# Patient Record
Sex: MALE | Race: BLACK OR AFRICAN AMERICAN | NOT HISPANIC OR LATINO | URBAN - METROPOLITAN AREA
[De-identification: names, ages, dates, MRNs, and addresses within clinical notes are randomized per-mention and may not be internally consistent; named-entity substitution may affect disease eponyms.]

---

## 2020-06-24 ENCOUNTER — OFFICE VISIT (OUTPATIENT)
Dept: URBAN - METROPOLITAN AREA CLINIC 96 | Facility: CLINIC | Age: 30
End: 2020-06-24
Payer: COMMERCIAL

## 2020-06-24 ENCOUNTER — DASHBOARD ENCOUNTERS (OUTPATIENT)
Age: 30
End: 2020-06-24

## 2020-06-24 DIAGNOSIS — R10.84 ABDOMINAL CRAMPING, GENERALIZED: ICD-10-CM

## 2020-06-24 DIAGNOSIS — R13.19 OTHER DYSPHAGIA: ICD-10-CM

## 2020-06-24 DIAGNOSIS — R11.2 NAUSEA & VOMITING: ICD-10-CM

## 2020-06-24 DIAGNOSIS — K92.0 HEMATEMESIS: ICD-10-CM

## 2020-06-24 PROCEDURE — 99204 OFFICE O/P NEW MOD 45 MIN: CPT | Performed by: INTERNAL MEDICINE

## 2020-06-24 PROCEDURE — G8420 CALC BMI NORM PARAMETERS: HCPCS | Performed by: INTERNAL MEDICINE

## 2020-06-24 PROCEDURE — G9903 PT SCRN TBCO ID AS NON USER: HCPCS | Performed by: INTERNAL MEDICINE

## 2020-06-24 PROCEDURE — G8427 DOCREV CUR MEDS BY ELIG CLIN: HCPCS | Performed by: INTERNAL MEDICINE

## 2020-06-24 PROCEDURE — 1036F TOBACCO NON-USER: CPT | Performed by: INTERNAL MEDICINE

## 2020-06-24 RX ORDER — PROMETHAZINE HYDROCHLORIDE 25 MG/1
1 SUPPOSITORY AS NEEDED SUPPOSITORY RECTAL
Status: ACTIVE | COMMUNITY

## 2020-06-24 NOTE — HPI-TODAY'S VISIT:
30-year-old male recently seen at [facility] on 6/23/2020.  Patient presented with chief complaint of nausea, vomiting.  Did admit to chronic marijuana use.  Prior CT abdomen pelvis on 5/9/2019 for generalized abdominal pain with large amount of stool in the sigmoid and rectum with no evidence of obstruction.  No acute inflammatory process.  Laboratory studies on 6/23/2020 with white cell count 6.6 hemoglobin 16 platelet count 236 AST 23 ALT 13 lipase 31.  (See chart for reviewed records) He reports having scant hematemesis.  Denies any melena, rectal bleeding, or unintentional weight loss.  Does report daily marijuana which he smokes for the past month to treat anxiety.  Recently initiated oral antianxiety medications and has not smoked marijuana for the past several days.  He denies any chronic NSAIDs, denies any history of peptic ulcer disease.  He denies any suicidal homicidal ideation.  Denies any family history of inflammatory bowel disease, liver disease, or GI malignancy.

## 2020-07-24 ENCOUNTER — OFFICE VISIT (OUTPATIENT)
Dept: URBAN - METROPOLITAN AREA SURGERY CENTER 18 | Facility: SURGERY CENTER | Age: 30
End: 2020-07-24

## 2020-12-01 ENCOUNTER — OFFICE VISIT (OUTPATIENT)
Dept: URBAN - METROPOLITAN AREA CLINIC 96 | Facility: CLINIC | Age: 30
End: 2020-12-01